# Patient Record
Sex: FEMALE | Race: WHITE | NOT HISPANIC OR LATINO | Employment: OTHER | URBAN - METROPOLITAN AREA
[De-identification: names, ages, dates, MRNs, and addresses within clinical notes are randomized per-mention and may not be internally consistent; named-entity substitution may affect disease eponyms.]

---

## 2020-10-14 ENCOUNTER — TRANSCRIBE ORDERS (OUTPATIENT)
Dept: PHYSICAL THERAPY | Facility: CLINIC | Age: 65
End: 2020-10-14

## 2020-10-14 DIAGNOSIS — G89.29 CHRONIC LOW BACK PAIN, UNSPECIFIED BACK PAIN LATERALITY, UNSPECIFIED WHETHER SCIATICA PRESENT: Primary | ICD-10-CM

## 2020-10-14 DIAGNOSIS — M54.50 CHRONIC LOW BACK PAIN, UNSPECIFIED BACK PAIN LATERALITY, UNSPECIFIED WHETHER SCIATICA PRESENT: Primary | ICD-10-CM

## 2020-10-16 ENCOUNTER — TREATMENT (OUTPATIENT)
Dept: PHYSICAL THERAPY | Facility: CLINIC | Age: 65
End: 2020-10-16

## 2020-10-16 DIAGNOSIS — M79.604 LUMBAR PAIN WITH RADIATION DOWN RIGHT LEG: ICD-10-CM

## 2020-10-16 DIAGNOSIS — M54.50 LUMBAR PAIN WITH RADIATION DOWN RIGHT LEG: ICD-10-CM

## 2020-10-16 DIAGNOSIS — R53.1 WEAKNESS: Primary | ICD-10-CM

## 2020-10-16 DIAGNOSIS — R26.81 UNSTEADY GAIT: ICD-10-CM

## 2020-10-16 PROCEDURE — 97162 PT EVAL MOD COMPLEX 30 MIN: CPT | Performed by: PHYSICAL THERAPIST

## 2020-10-16 PROCEDURE — 97110 THERAPEUTIC EXERCISES: CPT | Performed by: PHYSICAL THERAPIST

## 2020-10-16 NOTE — PROGRESS NOTES
Physical Therapy Initial Evaluation and Plan of Care    Patient: Eunice Ferris   : 1955  Diagnosis/ICD-10 Code:  Lumbar pain with radiation down right leg [M54.5]  Referring practitioner: Elin Erwin, PhD  Date of Initial Visit: 10/16/2020  Today's Date: 10/16/2020  Patient seen for 1 sessions           Subjective Questionnaire: Oswestry: 42/50       Subjective Evaluation    History of Present Illness  Mechanism of injury: Patient reports that she has had ongoing lumbar and RLE pain for years.  She did receive PT 2 yrs ago @ [facility], wo relief. Lumbar fusion  (Dr. Fernandez, );  States the pain level is much worse post the surgery. The original injury was from a fall/crush injury to L4L5 and R ankle fracture ( boot only).  She had a repeat myelogram post the surgery ( 2016)/  She reports over the last several months the pain frequency has increased.  She is using a rollator walker for ambulation x's 2 yrs due to the RLE pain and the RLE sudden onset of numbness.  Fall within the last yr, when the chair rolled out when she went to sit.   Also notes trips within [place]   Current home ex:  no  CHIEF CO:  Pain level/frequency  Difficulty getting around and dressing herself.  Imaging and EMG several yrs ago @ [facility]  TIME OF DAY:  No change relating to time of day.   SLEEP:   Normal sleep is 7 yrs hrs; positioning multi position.      Now she can only sleep about 2 hrs in the bed and then move to the recliner and wakes Q hr.   INCREASES:  Standing > walking,    DECREASES:  Heating pad   PAST MEDICAL HISTORY:     Breast CA with keesha mastectomy .     Quality of life: fair    Pain  Current pain ratin  At best pain ratin  At worst pain rating: 10 (90% of the time)  Quality: sharp, throbbing, radiating and burning (burning/tingling into the distal R thigh and foot)  Relieving factors: heat  Aggravating factors: sleeping, standing, movement, lifting and  ambulation    Social Support  Lives in: one-story house (2-step entry wo rail..   She plans to move by Thanksgiving closer to her dgtr and no step entry. )  Lives with: alone    Hand dominance: right    Treatments  Previous treatment: physical therapy, medication and injection treatment (last epidural was approx 2016 )  Current treatment: medication  Patient Goals  Patient goals for therapy: independence with ADLs/IADLs and increased strength  Patient goal: get more mobile.            Objective          Active Range of Motion   Left Hip   Flexion: 95 degrees   Abduction: WFL    Right Hip   Flexion: 68 degrees   Abduction: WFL  Left Knee   Flexion: WFL  Extension: WFL    Right Knee   Flexion: WFL  Extension: WFL    Additional Active Range of Motion Details  Pnt states that she can not lay prone, thus hip ext was not measured.      Passive Range of Motion     Right Hip   Flexion: 83 degrees     Strength/Myotome Testing     Left Hip   Planes of Motion   Flexion: 3  Abduction: 3  External rotation: 3    Right Hip   Planes of Motion   Flexion: 3-  Abduction: 3  External rotation: 3    Left Knee   Flexion: 3+  Extension: 3+    Right Knee   Flexion: 3+  Extension: 3+    Left Ankle/Foot   Dorsiflexion: 4-  Plantar flexion: 4-  Inversion: 4-  Eversion: 4-    Right Ankle/Foot   Dorsiflexion: 4-  Plantar flexion: 3+  Inversion: 4-  Eversion: 4-    Ambulation     Ambulation: Level Surfaces   Ambulation with assistive device: independent    Observational Gait   Decreased walking speed, stride length and left step length.   Right foot contact pattern: foot flat  Base of support: normal    Quality of Movement During Gait   Trunk  Forward lean.     Pelvis  Posterior pelvic tilt.           Assessment & Plan     Assessment  Impairments: abnormal coordination, abnormal gait, activity intolerance, impaired balance, impaired physical strength, lacks appropriate home exercise program, pain with function, safety issue and weight-bearing  intolerance  Assessment details: Ms. Ferris is a 65 yr old female referred to PT due to ongoing lumbar and RLE pain.  She states that her goal is to get stronger with better mobility.  She was able to stance unsupported; however upon attempts to step with would hold onto objects; she was also unable to SLS unless with use of UE support and then for only a fraction of a second.  Chetna does report lumbar pain; but would prefer to focus on her mobility and strength at this time.     This evaluation is on moderate nature due to:  1) medical history, 2)# areas assessed, 3) evolving and 4) decision making  Barriers to therapy: length of ongoing pain and immoblity.  prior PT wo relief of pain.  Prognosis: good  Functional Limitations: carrying objects, lifting, walking, pushing, uncomfortable because of pain, sitting, standing and stooping  Goals  Plan Goals: ST visits     1)  Patient will complete SLR ( all planes) in gravity resisted positions.     2)  Patient will report a 25% reduction in pain     3)  Patient will complete SLS x's 7 sec with mild assistance from UEs.     4)  Patient will demonstrate appropriate ex technique for the current ex    LTG   DC     1)  MMT =/> 4/5 BLE in order to improve her ability to complete tasks at home, including ADLs     2)  SLS with ocassional UE assist x's 10 seconds with improved ability to complete task that require transition from one area to another     3)  I ability to ascend/descend 2 steps wo rail     4)  Improve the JEANNETTE survey =/> 9 points     5)  IHEP     Plan  Therapy options: will be seen for skilled physical therapy services  Planned modality interventions: high voltage pulsed current (pain management), traction and thermotherapy (hydrocollator packs)  Planned therapy interventions: manual therapy, abdominal trunk stabilization, balance/weight-bearing training, flexibility, gait training, home exercise program, therapeutic activities, stretching, strengthening,  spinal/joint mobilization, soft tissue mobilization, postural training and neuromuscular re-education  Frequency: 2x week  Duration in visits: 20  Treatment plan discussed with: patient  Plan details: Nustep, heel lifts, step ups,        Timed:         Manual Therapy:         mins  96146;     Therapeutic Exercise:    17     mins  47381;     Neuromuscular Kati:        mins  65093;    Therapeutic Activity:          mins  50348;     Gait Training:           mins  70748;     Ultrasound:          mins  63994;    Ionto                                   mins   57708  Self Care                       4     mins   37830  Canalith Repos         mins 17413      Un-Timed:  Electrical Stimulation:         mins  27400 ( );  Dry Needling          mins self-pay  Traction          mins 89690  Low Eval          Mins  11123  Mod Eval     31     Mins  10041  High Eval                            Mins  14911  Re-Eval                               mins  31243  31      Timed Treatment:   20   mins   Total Treatment:     51   mins    PT SIGNATURE: Salima Bullock PT   DATE TREATMENT INITIATED: 10/16/2020    Initial Certification  Certification Period: 1/14/2021  I certify that the therapy services are furnished while this patient is under my care.  The services outlined above are required by this patient, and will be reviewed every 90 days.     PHYSICIAN: Elin Erwin, PhD      DATE:     Please sign and return via fax to [phone].. Thank you, [facility].

## 2020-10-20 ENCOUNTER — TREATMENT (OUTPATIENT)
Dept: PHYSICAL THERAPY | Facility: CLINIC | Age: 65
End: 2020-10-20

## 2020-10-20 DIAGNOSIS — R26.81 UNSTEADY GAIT: ICD-10-CM

## 2020-10-20 DIAGNOSIS — R53.1 WEAKNESS: ICD-10-CM

## 2020-10-20 DIAGNOSIS — M54.50 LUMBAR PAIN WITH RADIATION DOWN RIGHT LEG: Primary | ICD-10-CM

## 2020-10-20 DIAGNOSIS — M79.604 LUMBAR PAIN WITH RADIATION DOWN RIGHT LEG: Primary | ICD-10-CM

## 2020-10-20 PROCEDURE — 97110 THERAPEUTIC EXERCISES: CPT | Performed by: PHYSICAL THERAPIST

## 2020-10-20 NOTE — PROGRESS NOTES
Physical Therapy Daily Progress Note    VISIT#: 2    Subjective   Eunice Ferris reports she feels okay at rest but her RLE  Current Pain Level: 3-4/10 at rest    Objective   Patient tolerates laying on her back for exercises better when she has a wedge underneath her upper body.  See Exercise, Manual, and Modality Logs for complete treatment.     Patient Education: added SAQ. Continued to work on PPT and breathing technique. Handout given for SAQ and supine hip abduction.    Assessment/Plan  Patient does have difficulty breathing while alex the core. Low tolerance to exercise at this time. Able to perform 6 SLR's on the RLE today which was a huge a achievement for the patient. Her nerve pain would flare up with hip fall outs.    Progress per Plan of Care    Goals  Plan Goals: ST visits     1)  Patient will complete SLR ( all planes) in gravity resisted positions.     2)  Patient will report a 25% reduction in pain     3)  Patient will complete SLS x's 7 sec with mild assistance from UEs.     4)  Patient will demonstrate appropriate ex technique for the current ex    LTG   DC     1)  MMT =/> 4/5 BLE in order to improve her ability to complete tasks at home, including ADLs     2)  SLS with ocassional UE assist x's 10 seconds with improved ability to complete task that require transition from one area to another     3)  I ability to ascend/descend 2 steps wo rail     4)  Improve the JEANNETTE survey =/> 9 points     5)  IHEP           Timed:            Therapeutic Exercise:    36     mins  73989;         Un-Timed:      Timed Treatment:   36   mins   Total Treatment:     36   mins    Kierra Menard PTA    Physical Therapist Assistant

## 2020-10-26 ENCOUNTER — TREATMENT (OUTPATIENT)
Dept: PHYSICAL THERAPY | Facility: CLINIC | Age: 65
End: 2020-10-26

## 2020-10-26 DIAGNOSIS — R53.1 WEAKNESS: ICD-10-CM

## 2020-10-26 DIAGNOSIS — R26.81 UNSTEADY GAIT: ICD-10-CM

## 2020-10-26 DIAGNOSIS — M79.604 LUMBAR PAIN WITH RADIATION DOWN RIGHT LEG: Primary | ICD-10-CM

## 2020-10-26 DIAGNOSIS — M54.50 LUMBAR PAIN WITH RADIATION DOWN RIGHT LEG: Primary | ICD-10-CM

## 2020-10-26 PROCEDURE — 97110 THERAPEUTIC EXERCISES: CPT | Performed by: PHYSICAL THERAPIST

## 2020-10-26 NOTE — PROGRESS NOTES
Physical Therapy Daily Progress Note    VISIT#: 3    Subjective   Eunice Ferris reports her flexibility is improving and she is now able to get her shoes on a little easier. The nerve pain remains unchanged.  Current Pain Level: 8/10    Objective     See Exercise, Manual, and Modality Logs for complete treatment.     Patient Education: added H/L hip abd w/ TB. Handout and TB given to patient.    Assessment/Plan  Pt performed increased reps on the RLE but reported increased nerve pain. Able to move easier in general but the pain level remains the same. Good tolerance to the NuStep.    Progress per Plan of Care     Goals  Plan Goals: ST visits     1)  Patient will complete SLR ( all planes) in gravity resisted positions.     2)  Patient will report a 25% reduction in pain     3)  Patient will complete SLS x's 7 sec with mild assistance from UEs.     4)  Patient will demonstrate appropriate ex technique for the current ex    LTG   DC     1)  MMT =/> 4/5 BLE in order to improve her ability to complete tasks at home, including ADLs     2)  SLS with ocassional UE assist x's 10 seconds with improved ability to complete task that require transition from one area to another     3)  I ability to ascend/descend 2 steps wo rail     4)  Improve the JEANNETTE survey =/> 9 points     5)  IHEP           Timed:            Therapeutic Exercise:    40     mins  52282;         Un-Timed:      Timed Treatment:   40   mins   Total Treatment:     45   mins    Kierra Menard PTA    Physical Therapist Assistant

## 2020-10-28 ENCOUNTER — TREATMENT (OUTPATIENT)
Dept: PHYSICAL THERAPY | Facility: CLINIC | Age: 65
End: 2020-10-28

## 2020-10-28 DIAGNOSIS — M54.50 LUMBAR PAIN WITH RADIATION DOWN RIGHT LEG: Primary | ICD-10-CM

## 2020-10-28 DIAGNOSIS — M79.604 LUMBAR PAIN WITH RADIATION DOWN RIGHT LEG: Primary | ICD-10-CM

## 2020-10-28 DIAGNOSIS — R53.1 WEAKNESS: ICD-10-CM

## 2020-10-28 DIAGNOSIS — R26.81 UNSTEADY GAIT: ICD-10-CM

## 2020-10-28 PROCEDURE — 97110 THERAPEUTIC EXERCISES: CPT | Performed by: PHYSICAL THERAPIST

## 2020-10-28 NOTE — PROGRESS NOTES
Physical Therapy Daily Progress Note    VISIT#: 4    Subjective   Eunice Ferris reports she has been pretty sore over the past few days. She always feels a little worse on rainy/cold days.  Current Pain Level: 8/10      Objective     See Exercise, Manual, and Modality Logs for complete treatment.     Patient Education: added wt to SAQ. Added butterfly str and seated HS stretch. Handouts given.    Assessment/Plan  SLR's on the RLE remain very challenging to complete. By the end of her session she reported that this is the best she has felt in a very long time. She did present with the nerve pain down the RLE during treatment however it did not seem to occur as frequently when compared to previous sessions. Her flexibility is also showing bassam improvement.    Progress per Plan of Care     Goals  Plan Goals: ST visits     1)  Patient will complete SLR ( all planes) in gravity resisted positions.     2)  Patient will report a 25% reduction in pain     3)  Patient will complete SLS x's 7 sec with mild assistance from UEs.     4)  Patient will demonstrate appropriate ex technique for the current ex    LTG   DC     1)  MMT =/> 4/5 BLE in order to improve her ability to complete tasks at home, including ADLs     2)  SLS with ocassional UE assist x's 10 seconds with improved ability to complete task that require transition from one area to another     3)  I ability to ascend/descend 2 steps wo rail     4)  Improve the JEANNETTE survey =/> 9 points     5)  IHEP           Timed:            Therapeutic Exercise:    45     mins  [id number];           Un-Timed:        Timed Treatment:   45   mins   Total Treatment:     45   mins    Kierra Menard PTA    Physical Therapist Assistant

## 2020-11-02 ENCOUNTER — TREATMENT (OUTPATIENT)
Dept: PHYSICAL THERAPY | Facility: CLINIC | Age: 65
End: 2020-11-02

## 2020-11-02 DIAGNOSIS — R53.1 WEAKNESS: ICD-10-CM

## 2020-11-02 DIAGNOSIS — R26.81 UNSTEADY GAIT: ICD-10-CM

## 2020-11-02 DIAGNOSIS — M54.50 LUMBAR PAIN WITH RADIATION DOWN RIGHT LEG: Primary | ICD-10-CM

## 2020-11-02 DIAGNOSIS — M79.604 LUMBAR PAIN WITH RADIATION DOWN RIGHT LEG: Primary | ICD-10-CM

## 2020-11-02 PROCEDURE — 97110 THERAPEUTIC EXERCISES: CPT | Performed by: PHYSICAL THERAPIST

## 2020-11-02 NOTE — PROGRESS NOTES
Physical Therapy Daily Progress Note    VISIT#: 5    Subjective   Eunice Ferris reports her nerve pain is flared up but as far as her muscle soreness she feels really good.   Current Pain Level: 8-9/10 (nerve)    Objective     See Exercise, Manual, and Modality Logs for complete treatment.     Patient Education: added neural slump    Assessment/Plan  Patient's nerve pain in the RLE was very flared up and made it difficult to perform her exercises on that side. Her motion is showing slight improvement but can be limited by pain at times.      Progress per Plan of Care     Goals  Plan Goals: ST visits     1)  Patient will complete SLR ( all planes) in gravity resisted positions.     2)  Patient will report a 25% reduction in pain     3)  Patient will complete SLS x's 7 sec with mild assistance from UEs.     4)  Patient will demonstrate appropriate ex technique for the current ex    LTG   DC     1)  MMT =/> 4/5 BLE in order to improve her ability to complete tasks at home, including ADLs     2)  SLS with ocassional UE assist x's 10 seconds with improved ability to complete task that require transition from one area to another     3)  I ability to ascend/descend 2 steps wo rail     4)  Improve the JEANNETTE survey =/> 9 points     5)  IHEP           Timed:            Therapeutic Exercise:    46     mins  40112;            Un-Timed:      Timed Treatment:   46   mins   Total Treatment:     46   mins    Kierra Menard PTA    Physical Therapist Assistant

## 2020-11-04 ENCOUNTER — TREATMENT (OUTPATIENT)
Dept: PHYSICAL THERAPY | Facility: CLINIC | Age: 65
End: 2020-11-04

## 2020-11-04 DIAGNOSIS — M54.50 LUMBAR PAIN WITH RADIATION DOWN RIGHT LEG: Primary | ICD-10-CM

## 2020-11-04 DIAGNOSIS — M79.604 LUMBAR PAIN WITH RADIATION DOWN RIGHT LEG: Primary | ICD-10-CM

## 2020-11-04 DIAGNOSIS — R26.81 UNSTEADY GAIT: ICD-10-CM

## 2020-11-04 DIAGNOSIS — R53.1 WEAKNESS: ICD-10-CM

## 2020-11-04 PROCEDURE — 97110 THERAPEUTIC EXERCISES: CPT | Performed by: PHYSICAL THERAPIST

## 2020-11-04 NOTE — PROGRESS NOTES
Physical Therapy Daily Progress Note    VISIT#: 6    Subjective   Eunice Ferris reports the stretches are helping her muscles a lot but her nerve pain remains unchanged.  Current Pain Level: 7/10 (nerve)    Objective     See Exercise, Manual, and Modality Logs for complete treatment.     Gait: antalgic with lateral lean to the R, keeps R knee bent; encouraged her to keep her knee straight during the stance phase on the RLE.    Assessment/Plan  Patient became tearful during her session due to frustration with her condition. She can tell PT is helping her muscles but bc of the nerve pain and not being approved for an MRI/nerve ablation she feels like she just isn't getting better. She does tolerate some of the stretches without increased pain but the strengthening is still very challenging and painful.    Next Visit: standing heel raises, weight shifting    Progress per Plan of Care     Goals  Plan Goals: ST visits     1)  Patient will complete SLR ( all planes) in gravity resisted positions.     2)  Patient will report a 25% reduction in pain     3)  Patient will complete SLS x's 7 sec with mild assistance from UEs.     4)  Patient will demonstrate appropriate ex technique for the current ex    LTG   DC     1)  MMT =/> 4/5 BLE in order to improve her ability to complete tasks at home, including ADLs     2)  SLS with ocassional UE assist x's 10 seconds with improved ability to complete task that require transition from one area to another     3)  I ability to ascend/descend 2 steps wo rail     4)  Improve the JEANNETTE survey =/> 9 points     5)  IHEP           Timed:            Therapeutic Exercise:    40     mins  73911;          Un-Timed:      Timed Treatment:   40   mins   Total Treatment:     40   mins    Kierra Menard PTA    Physical Therapist Assistant

## 2020-11-09 ENCOUNTER — TREATMENT (OUTPATIENT)
Dept: PHYSICAL THERAPY | Facility: CLINIC | Age: 65
End: 2020-11-09

## 2020-11-09 DIAGNOSIS — M79.604 LUMBAR PAIN WITH RADIATION DOWN RIGHT LEG: Primary | ICD-10-CM

## 2020-11-09 DIAGNOSIS — R26.81 UNSTEADY GAIT: ICD-10-CM

## 2020-11-09 DIAGNOSIS — M54.50 LUMBAR PAIN WITH RADIATION DOWN RIGHT LEG: Primary | ICD-10-CM

## 2020-11-09 DIAGNOSIS — R53.1 WEAKNESS: ICD-10-CM

## 2020-11-09 PROCEDURE — 97530 THERAPEUTIC ACTIVITIES: CPT | Performed by: PHYSICAL THERAPIST

## 2020-11-09 PROCEDURE — 97110 THERAPEUTIC EXERCISES: CPT | Performed by: PHYSICAL THERAPIST

## 2020-11-09 NOTE — PROGRESS NOTES
Physical Therapy Daily Progress Note    VISIT#: 7    Subjective   Eunice Ferris reports she has started using her quad to cane to help her stand more upright. This has helped her pain some. She has to be very cautious when using the cane so she doesn't loose her balance.  Current Pain Level: 8/10 (nerve)    Objective     See Exercise, Manual, and Modality Logs for complete treatment.     Patient Education: added standing heel raises and diagonal weight shifts on foam pad. Handouts given for HEP.    Assessment/Plan  Patient seemed to transition between different positions with a little more ease. Nerve pain in the RLE is still present and at high levels but she is able to push/work through it. Tolerated new exercises well.       Progress per Plan of Care     Goals  Plan Goals: ST visits     1)  Patient will complete SLR ( all planes) in gravity resisted positions.     2)  Patient will report a 25% reduction in pain     3)  Patient will complete SLS x's 7 sec with mild assistance from UEs.     4)  Patient will demonstrate appropriate ex technique for the current ex    LTG   DC     1)  MMT =/> 4/5 BLE in order to improve her ability to complete tasks at home, including ADLs     2)  SLS with ocassional UE assist x's 10 seconds with improved ability to complete task that require transition from one area to another     3)  I ability to ascend/descend 2 steps wo rail     4)  Improve the JEANNETTE survey =/> 9 points     5)  IHEP           Timed:           Therapeutic Exercise:    35     mins  54677;      Therapeutic Activity:     10     mins  55920;         Un-Timed:      Timed Treatment:   45   mins   Total Treatment:     48   mins    Kierra Menard PTA    Physical Therapist Assistant

## 2020-11-11 ENCOUNTER — TREATMENT (OUTPATIENT)
Dept: PHYSICAL THERAPY | Facility: CLINIC | Age: 65
End: 2020-11-11

## 2020-11-11 DIAGNOSIS — R26.81 UNSTEADY GAIT: ICD-10-CM

## 2020-11-11 DIAGNOSIS — R53.1 WEAKNESS: ICD-10-CM

## 2020-11-11 DIAGNOSIS — M79.604 LUMBAR PAIN WITH RADIATION DOWN RIGHT LEG: Primary | ICD-10-CM

## 2020-11-11 DIAGNOSIS — M54.50 LUMBAR PAIN WITH RADIATION DOWN RIGHT LEG: Primary | ICD-10-CM

## 2020-11-11 PROCEDURE — 97140 MANUAL THERAPY 1/> REGIONS: CPT | Performed by: PHYSICAL THERAPIST

## 2020-11-11 PROCEDURE — 97110 THERAPEUTIC EXERCISES: CPT | Performed by: PHYSICAL THERAPIST

## 2020-11-11 NOTE — PROGRESS NOTES
Physical Therapy Daily Progress Note    VISIT#: 8    Subjective   Eunice Ferris reports pain remained unchanged in the nerve. However, she has been walking better and standing taller as she walks. She was a little sore after shower this morning. The muscle ache does subside after a while.   Pain Level: 6/10 (nerve)    Objective     See Exercise, Manual, and Modality Logs for complete treatment.     Patient Education Instructed the patient in bridges for HEP.    Assessment/Plan   The pt pushed through the exercises and tolerated them well. Introduced a new exercise (bridges) and the patient was able to complete it without any pain. However, she was fatigued towards the end of the therapy session.    Progress per plan of care.    Next Visit: progress standing exercises as tolerated    Goals  Plan Goals: ST visits     1)  Patient will complete SLR ( all planes) in gravity resisted positions.     2)  Patient will report a 25% reduction in pain     3)  Patient will complete SLS x's 7 sec with mild assistance from UEs.     4)  Patient will demonstrate appropriate ex technique for the current ex    LTG   DC     1)  MMT =/> 4/5 BLE in order to improve her ability to complete tasks at home, including ADLs     2)  SLS with ocassional UE assist x's 10 seconds with improved ability to complete task that require transition from one area to another     3)  I ability to ascend/descend 2 steps wo rail     4)  Improve the JEANNETTE survey =/> 9 points     5)  IHEP              Timed:         Manual Therapy:    10     mins  16605;     Therapeutic Exercise:    35   mins  03328;       Un-Timed:      Timed Treatment:   45   mins   Total Treatment:     50  mins    Kierra Menard PTA    Physical Therapist Assistant

## 2020-11-16 ENCOUNTER — TREATMENT (OUTPATIENT)
Dept: PHYSICAL THERAPY | Facility: CLINIC | Age: 65
End: 2020-11-16

## 2020-11-16 DIAGNOSIS — M79.604 LUMBAR PAIN WITH RADIATION DOWN RIGHT LEG: Primary | ICD-10-CM

## 2020-11-16 DIAGNOSIS — M54.50 LUMBAR PAIN WITH RADIATION DOWN RIGHT LEG: Primary | ICD-10-CM

## 2020-11-16 PROCEDURE — 97140 MANUAL THERAPY 1/> REGIONS: CPT | Performed by: PHYSICAL THERAPIST

## 2020-11-16 PROCEDURE — 97110 THERAPEUTIC EXERCISES: CPT | Performed by: PHYSICAL THERAPIST

## 2020-11-16 NOTE — PROGRESS NOTES
Physical Therapy Daily Progress Note    VISIT#: 9    Subjective   Eunice Ferris reports she is moving into a new house and has been having to move around a lot more. Therefore, her right leg has been very tight.   Current Pain Level: 8/10 (nerve)      Objective     See Exercise, Manual, and Modality Logs for complete treatment.     Patient Education: Instructed the patient in a new stretch (prone quad stretch). The patient tolerated this stretch really well on the left leg, but it was difficult on the right leg. The patient was also given a handout of this stretch for HEP.     Assessment/Plan   The patient c/o tightness in the right leg so we mainly worked on stretches today instead of strengthening exercises. The patient completed all of the stretches/exercises today with mild fatigue due to the tightness in the right leg. The nerve pain in her right leg made it difficult for her to complete the prone quad stretch but she was able to complete the other side without any issue.    Progress per plan of care       Goals  Plan Goals: ST visits     1)  Patient will complete SLR ( all planes) in gravity resisted positions.     2)  Patient will report a 25% reduction in pain     3)  Patient will complete SLS x's 7 sec with mild assistance from UEs.     4)  Patient will demonstrate appropriate ex technique for the current ex    LTG   DC     1)  MMT =/> 4/5 BLE in order to improve her ability to complete tasks at home, including ADLs     2)  SLS with ocassional UE assist x's 10 seconds with improved ability to complete task that require transition from one area to another     3)  I ability to ascend/descend 2 steps wo rail     4)  Improve the JEANNETTE survey =/> 9 points     5)  IHEP                 Timed:         Manual Therapy:    10    mins  78937;     Therapeutic Exercise:    28  mins  34800;         Timed Treatment:   38   mins   Total Treatment:     48   mins    Kierra Menard PTA    Physical Therapist Assistant

## 2020-11-25 ENCOUNTER — TREATMENT (OUTPATIENT)
Dept: PHYSICAL THERAPY | Facility: CLINIC | Age: 65
End: 2020-11-25

## 2020-11-25 DIAGNOSIS — M54.50 LUMBAR PAIN WITH RADIATION DOWN RIGHT LEG: Primary | ICD-10-CM

## 2020-11-25 DIAGNOSIS — M79.604 LUMBAR PAIN WITH RADIATION DOWN RIGHT LEG: Primary | ICD-10-CM

## 2020-11-25 DIAGNOSIS — R53.1 WEAKNESS: ICD-10-CM

## 2020-11-25 DIAGNOSIS — R26.81 UNSTEADY GAIT: ICD-10-CM

## 2020-11-25 PROCEDURE — 97110 THERAPEUTIC EXERCISES: CPT | Performed by: PHYSICAL THERAPIST

## 2020-11-25 PROCEDURE — 97530 THERAPEUTIC ACTIVITIES: CPT | Performed by: PHYSICAL THERAPIST

## 2020-11-25 PROCEDURE — 97140 MANUAL THERAPY 1/> REGIONS: CPT | Performed by: PHYSICAL THERAPIST

## 2020-11-25 NOTE — PROGRESS NOTES
Physical Therapy Daily Progress Note    VISIT#: 10    Subjective   Eunice Ferris reports her Dr said since will be completing 10 visits they will approve her MRI. She sees the MD on Dec 2 and they will schedule her MRI at that point.  Current Pain Level: 8/10 nerve pain     0-1/10 muscle pain    Objective   TTP along B IT bands from the hip to the knee.    See Exercise, Manual, and Modality Logs for complete treatment.     Patient Education: cues for the ex    Assessment/Plan  Patient has shown progress with PT with her hip ROM, muscle pain and ability to transition into different positions. Her gait has also shown improvement since the eval by demonstrating a decreased limp however doesn't feel safe to ambulate without an AD due to her nerve pain. Her nerve pain into the RLE remains high and makes her leg feel like it wants to give out. She returns to the MD next week and will be scheduling an MRI. Pending the results of the MRI she is hoping to get an ablation approved. She feels as if she is starting to hit a plateau with PT due to her nerve pain.      Progress per plan of care         Goals  Plan Goals: ST visits     1)  Patient will complete SLR ( all planes) in gravity resisted positions.     2)  Patient will report a 25% reduction in pain     3)  Patient will complete SLS x's 7 sec with mild assistance from UEs.     4)  Patient will demonstrate appropriate ex technique for the current ex    LTG   DC     1)  MMT =/> 4/5 BLE in order to improve her ability to complete tasks at home, including ADLs     2)  SLS with ocassional UE assist x's 10 seconds with improved ability to complete task that require transition from one area to another     3)  I ability to ascend/descend 2 steps wo rail     4)  Improve the JEANNETTE survey =/> 9 points     5)  IHEP           Timed:         Manual Therapy:    10     mins  93998;     Therapeutic Exercise:    22     mins  88121;     Therapeutic Activity:     10     mins  65898;          Un-Timed:      Timed Treatment:   42   mins   Total Treatment:     50   mins    Kierra Menard, PTA    Physical Therapist Assistant

## 2020-12-01 ENCOUNTER — TREATMENT (OUTPATIENT)
Dept: PHYSICAL THERAPY | Facility: CLINIC | Age: 65
End: 2020-12-01

## 2020-12-01 DIAGNOSIS — R53.1 WEAKNESS: ICD-10-CM

## 2020-12-01 DIAGNOSIS — M54.50 LUMBAR PAIN WITH RADIATION DOWN RIGHT LEG: Primary | ICD-10-CM

## 2020-12-01 DIAGNOSIS — R26.81 UNSTEADY GAIT: ICD-10-CM

## 2020-12-01 DIAGNOSIS — M79.604 LUMBAR PAIN WITH RADIATION DOWN RIGHT LEG: Primary | ICD-10-CM

## 2020-12-01 PROCEDURE — 97110 THERAPEUTIC EXERCISES: CPT | Performed by: PHYSICAL THERAPIST

## 2020-12-01 PROCEDURE — 97140 MANUAL THERAPY 1/> REGIONS: CPT | Performed by: PHYSICAL THERAPIST

## 2020-12-01 PROCEDURE — 97164 PT RE-EVAL EST PLAN CARE: CPT | Performed by: PHYSICAL THERAPIST

## 2020-12-01 NOTE — PROGRESS NOTES
Re-Assessment / Progress Note    Patient: Eunice Ferris   : 1955  Diagnosis/ICD-10 Code:  Lumbar pain with radiation down right leg [M54.5]  Referring practitioner: Elin Erwin, PhD  Date of Initial Visit: Episode Type: THERAPY  Noted: 10/16/2020    Today's Date: 2020  Patient seen for 11 sessions.      Subjective:   Eunice Ferris reports: that she feels that her muscle pain has decreased but her nerve pain is the same if not worse at this time. Pt rates her nerve pain a 7/10 and her muscel pain a 4/10 today.   Subjective Questionnaire: Oswestry: 80% disability  Clinical Progress: Pt reports that her muscle pain and improved but her nerve pain is unchanged or worsened  Home Program Compliance: Yes  Treatment has included: therapeutic exercise, neuromuscular re-education, manual therapy, therapeutic activity, moist heat and cryotherapy    Subjective   Objective           General Comments     Lumbar Comments  Active Range of Motion   Left Hip   Flexion: 100 degrees   Abduction: WFL  Right Hip   Flexion: 80 degrees   Abduction: WFL  Left Knee   Flexion: WFL  Extension: WFL    Right Knee   Flexion: WFL  Extension: WFL    Additional Active Range of Motion Details  Pnt states that she can not lay prone, thus hip ext was not measured.      Strength/Myotome Testing     Left Hip   Planes of Motion   Flexion: 5  Abduction: 5  External rotation: 5    Right Hip   Planes of Motion   Flexion: 4+  Abduction:5  External rotation: 5    Left Knee   Flexion: 5  Extension: 5    Right Knee   Flexion: 5  Extension: 5    Left Ankle/Foot   Dorsiflexion: 5  Plantar flexion: 5  Inversion: 5  Eversion: 5    Right Ankle/Foot   Dorsiflexion: 5  Plantar flexion: 5  Inversion: 5  Eversion: 5    Ambulation     Ambulation: Level Surfaces   Ambulation with assistive device: independent    Observational Gait   Decreased walking speed, stride length and left step length.   Right foot contact pattern: foot flat  Base of support:  normal    Quality of Movement During Gait   Trunk  Forward lean.     Pelvis  Posterior pelvic tilt.                      Assessment & Plan     Assessment  Impairments: abnormal coordination, abnormal gait, abnormal or restricted ROM, activity intolerance, impaired balance, impaired physical strength, safety issue and weight-bearing intolerance  Assessment details: Ms. Ferris is a 65 yr old female referred to PT due to ongoing lumbar and RLE pain.  At initial eval she statee that her goal is to get stronger with better mobility.  She was able to stance unsupported; however upon attempts to step with would hold onto objects; she was also unable to SLS unless with use of UE support and then for only a fraction of a second.  Chetna does report lumbar pain; but would prefer to focus on her mobility and strength at this time. Today pt reports that she has noted improvements in her muscle pain but is continuing to have nerve pain Pt has demonstrated improvements in JOVI hip ROM and BLE strength, but still demonstrates 80 % disability on Modified  Oswestry, and has overall high levels of pain. Pt would benefit from skilled PT to address the remaining impairments.               Goals  Plan Goals: Plan Goals: ST visits     1)  Patient will complete SLR ( all planes) in gravity resisted positions.- progressin     2)  Patient will report a 25% reduction in pain- progressing 20% muscle pain     3)  Patient will complete SLS x's 7 sec with mild assistance from UEs.- progressing     4)  Patient will demonstrate appropriate ex technique for the current ex- met    LTG   DC     1)  MMT =/> 4/5 BLE in order to improve her ability to complete tasks at home, including ADLs- progressing     2)  SLS with ocassional UE assist x's 10 seconds with improved ability to complete task that require transition from one area to another- progressing     3)  I ability to ascend/descend 2 steps wo rail- progressing     4)  Improve the JEANNETTE survey  =/> 9 points- progresing     5)  IHEP - progressing    Plan  Therapy options: will be seen for skilled physical therapy services  Duration in visits: 12  Treatment plan discussed with: patient      Progress toward previous goals: Partially Met      Recommendations: Continue as planned  Timeframe: 6 weeks  Prognosis to achieve goals: fair    PT Signature: Layla Singh, PT  [place] Lic. # [id number]        Based upon review of the patient's progress and continued therapy plan, it is my medical opinion that Eunice Ferris should continue physical therapy treatment at [facility]  [address]  [place] [address] [phone].    Signature: __________________________________  Elin Erwin, PhD    Timed:         Manual Therapy:    10     mins  18234;     Therapeutic Exercise:    20     mins  92484;     Neuromuscular Kati:        mins  42549;    Therapeutic Activity:          mins  48844;     Gait Training:           mins  40694;     Ultrasound:          mins  50741;    Ionto                                   mins   96744  Self Care                            mins   49840        Un-Timed:  Electrical Stimulation:         mins  52728 ( );  Dry Needling          mins self-pay  Traction          mins 54156  Low Eval          Mins  43255  Mod Eval          Mins  64608  High Eval                            Mins  18581  Re-Eval                           15    mins  95709        Timed Treatment:   30   mins   Total Treatment:     45   mins

## 2020-12-03 ENCOUNTER — TREATMENT (OUTPATIENT)
Dept: PHYSICAL THERAPY | Facility: CLINIC | Age: 65
End: 2020-12-03

## 2020-12-03 DIAGNOSIS — M54.50 LUMBAR PAIN WITH RADIATION DOWN RIGHT LEG: Primary | ICD-10-CM

## 2020-12-03 DIAGNOSIS — M79.604 LUMBAR PAIN WITH RADIATION DOWN RIGHT LEG: Primary | ICD-10-CM

## 2020-12-03 PROCEDURE — 97140 MANUAL THERAPY 1/> REGIONS: CPT | Performed by: PHYSICAL THERAPIST

## 2020-12-03 PROCEDURE — 97530 THERAPEUTIC ACTIVITIES: CPT | Performed by: PHYSICAL THERAPIST

## 2020-12-03 PROCEDURE — 97110 THERAPEUTIC EXERCISES: CPT | Performed by: PHYSICAL THERAPIST

## 2020-12-03 NOTE — PROGRESS NOTES
Physical Therapy Daily Progress Note    VISIT#: 12  Subjective   Eunice Ferris reports: she has been having trouble lying on her right side due to the pinched nerve. The nerve pain has remained the same. She is waiting for MRI to be scheduled.  Current Pain Level: 8/10 (nerve pain)      Objective     See Exercise, Manual, and Modality Logs for complete treatment.     Patient Education: Incorporated weights on the SLR.     Assessment/Plan   Incorporated 1# weights on the SLR. The patient tolerated it really well on the left leg but only able to do half the repetitions on the right leg due to pain. The patient was able to complete all of the other exercises and took breaks in between as needed.     Progress per plan of care    Goals  Plan Goals: Plan Goals: ST visits     1)  Patient will complete SLR ( all planes) in gravity resisted positions.- progressin     2)  Patient will report a 25% reduction in pain- progressing 20% muscle pain     3)  Patient will complete SLS x's 7 sec with mild assistance from UEs.- progressing     4)  Patient will demonstrate appropriate ex technique for the current ex- met    LTG   DC     1)  MMT =/> 4/5 BLE in order to improve her ability to complete tasks at home, including ADLs- progressing     2)  SLS with ocassional UE assist x's 10 seconds with improved ability to complete task that require transition from one area to another- progressing     3)  I ability to ascend/descend 2 steps wo rail- progressing     4)  Improve the JEANNETTE survey =/> 9 points- progresing     5)  IHEP - progressing                  Timed:         Manual Therapy:    10    mins  98609;     Therapeutic Exercise:    22     mins  82241;    Therapeutic Activity:      10     mins  79454;        Timed Treatment:   42  mins   Total Treatment:    50   mins    Kierra Menard PTA    Physical Therapist Assistant

## 2020-12-10 ENCOUNTER — TREATMENT (OUTPATIENT)
Dept: PHYSICAL THERAPY | Facility: CLINIC | Age: 65
End: 2020-12-10

## 2020-12-10 DIAGNOSIS — M54.50 LUMBAR PAIN WITH RADIATION DOWN RIGHT LEG: Primary | ICD-10-CM

## 2020-12-10 DIAGNOSIS — M79.604 LUMBAR PAIN WITH RADIATION DOWN RIGHT LEG: Primary | ICD-10-CM

## 2020-12-10 DIAGNOSIS — R53.1 WEAKNESS: ICD-10-CM

## 2020-12-10 DIAGNOSIS — R26.81 UNSTEADY GAIT: ICD-10-CM

## 2020-12-10 PROCEDURE — 97110 THERAPEUTIC EXERCISES: CPT | Performed by: PHYSICAL THERAPIST

## 2020-12-10 PROCEDURE — 97530 THERAPEUTIC ACTIVITIES: CPT | Performed by: PHYSICAL THERAPIST

## 2020-12-10 NOTE — PROGRESS NOTES
Physical Therapy Daily Progress Note    VISIT#: 13    Subjective   Eunice Ferris reports she tried to sleep in her bed last night, which she has only done a handful of times in the last 4 years and it has flared her pain up significantly.  Current Pain Level: Muscles 4/10   Nerve 8/10    Objective   Observation: Patient walked into the clinic with her forearms resting on her rollator handles and bent at the waste to alleviate her back pain.    See Exercise, Manual, and Modality Logs for complete treatment.     Assessment/Plan  Had to limit the amount of reps with certain exercises today due to increased pain on her right side. Attempted to perform standing exercises. She painfully completed weight shifts then had to stop. She still hasn't heard back from her insurance if the MRI has been approved.     Goals  Plan Goals: Plan Goals: ST visits     1)  Patient will complete SLR ( all planes) in gravity resisted positions.- progressing     2)  Patient will report a 25% reduction in pain- progressing 20% muscle pain- not met     3)  Patient will complete SLS x's 7 sec with mild assistance from UEs.- progressing     4)  Patient will demonstrate appropriate ex technique for the current ex- met    LTG   DC     1)  MMT =/> 4/5 BLE in order to improve her ability to complete tasks at home, including ADLs- progressing     2)  SLS with ocassional UE assist x's 10 seconds with improved ability to complete task that require transition from one area to another- progressing     3)  I ability to ascend/descend 2 steps wo rail- progressing     4)  Improve the JEANNETTE survey =/> 9 points- progressing     5)  IHEP - progressing            Timed:            Therapeutic Exercise:    30     mins  30069;       Therapeutic Activity:     8     mins  76797;           Un-Timed:  Bike 10 min (no charge)      Timed Treatment:   38   mins   Total Treatment:     48   mins    Kierra Menard PTA    Physical Therapist Assistant

## 2020-12-15 ENCOUNTER — TREATMENT (OUTPATIENT)
Dept: PHYSICAL THERAPY | Facility: CLINIC | Age: 65
End: 2020-12-15

## 2020-12-15 DIAGNOSIS — M79.604 LUMBAR PAIN WITH RADIATION DOWN RIGHT LEG: Primary | ICD-10-CM

## 2020-12-15 DIAGNOSIS — R26.81 UNSTEADY GAIT: ICD-10-CM

## 2020-12-15 DIAGNOSIS — R53.1 WEAKNESS: ICD-10-CM

## 2020-12-15 DIAGNOSIS — M54.50 LUMBAR PAIN WITH RADIATION DOWN RIGHT LEG: Primary | ICD-10-CM

## 2020-12-15 PROCEDURE — 97530 THERAPEUTIC ACTIVITIES: CPT | Performed by: PHYSICAL THERAPIST

## 2020-12-15 PROCEDURE — 97110 THERAPEUTIC EXERCISES: CPT | Performed by: PHYSICAL THERAPIST

## 2020-12-15 NOTE — PROGRESS NOTES
"Physical Therapy Daily Progress Note    VISIT#: 14    Subjective   Eunice Ferris reports she is finally getting her MRI done today at 3:15!  Current Pain Level: 5/10 (nerve)    4/10 (muscle)    Objective   Gait: antalgic, longer stride w/ RLE, trunk held upright  See Exercise, Manual, and Modality Logs for complete treatment.     Patient Education: added mini squats to HEP. Handout given.    Assessment/Plan  No reports of the nerve in the R leg \"firing up\" today with any of her exercises which is the first time this has happened since starting PT. Able to tolerate all of her strengthening exercises very well with the addition of mini squats. Improvement seen with her gait (w/o AD). It is still antalgic however her trunk is more upright and she is taking a longer stride with the right leg.      Progress per Plan of Care    Goals  Plan Goals: Plan Goals: ST visits     1)  Patient will complete SLR ( all planes) in gravity resisted positions.- progressing     2)  Patient will report a 25% reduction in pain- progressing 20% muscle pain- not met     3)  Patient will complete SLS x's 7 sec with mild assistance from UEs.- progressing     4)  Patient will demonstrate appropriate ex technique for the current ex- met    LTG   DC     1)  MMT =/> 4/5 BLE in order to improve her ability to complete tasks at home, including ADLs- progressing     2)  SLS with ocassional UE assist x's 10 seconds with improved ability to complete task that require transition from one area to another- progressing     3)  I ability to ascend/descend 2 steps wo rail- progressing     4)  Improve the JEANNETTE survey =/> 9 points- progressing     5)  IHEP - progressing          Timed:            Therapeutic Exercise:    26     mins  88768;       Therapeutic Activity:     16     mins  30697;         Un-Timed:      Timed Treatment:   42   mins   Total Treatment:     42   mins    Kierra Menard PTA    Physical Therapist Assistant  "

## 2020-12-17 ENCOUNTER — TREATMENT (OUTPATIENT)
Dept: PHYSICAL THERAPY | Facility: CLINIC | Age: 65
End: 2020-12-17

## 2020-12-17 PROCEDURE — 97110 THERAPEUTIC EXERCISES: CPT | Performed by: PHYSICAL THERAPIST

## 2020-12-17 PROCEDURE — 97530 THERAPEUTIC ACTIVITIES: CPT | Performed by: PHYSICAL THERAPIST

## 2020-12-17 NOTE — PROGRESS NOTES
Physical Therapy Daily Progress Note    VISIT#: 15    Subjective   Eunice Ferris reports she did get her MRI yesterday. They had to take her on and off the machine 3 times because her LLE kept cramping. She is extremely sore from the MRI.  She has an MD appointment tomorrow to get the results of her MRI.    Current Pain Level: 8/10 (nerve & muscle)    Objective     See Exercise, Manual, and Modality Logs for complete treatment.     Patient Education: nothing new added this time due to increased pain    Assessment/Plan  Patient presented with increased pain/soreness in the LB and BLE (R>L). She was able to complete all exercises asked of her but did them painfully.    Progress per Plan of Care     Goals  Plan Goals: Plan Goals: ST visits     1)  Patient will complete SLR ( all planes) in gravity resisted positions.- progressing     2)  Patient will report a 25% reduction in pain- progressing 20% muscle pain- not met     3)  Patient will complete SLS x's 7 sec with mild assistance from UEs.- progressing     4)  Patient will demonstrate appropriate ex technique for the current ex- met    LTG   DC     1)  MMT =/> 4/5 BLE in order to improve her ability to complete tasks at home, including ADLs- progressing     2)  SLS with ocassional UE assist x's 10 seconds with improved ability to complete task that require transition from one area to another- progressing     3)  I ability to ascend/descend 2 steps wo rail- progressing     4)  Improve the JEANNETTE survey =/> 9 points- progressing     5)  IHEP - progressing          Timed:            Therapeutic Exercise:    15     mins  52398;      Therapeutic Activity:     10     mins  49212;         Un-Timed:  Bike: 10 min (no charge)    Timed Treatment:   35   mins   Total Treatment:     45   mins    Kierra Menard PTA    Physical Therapist Assistant

## 2020-12-22 ENCOUNTER — TREATMENT (OUTPATIENT)
Dept: PHYSICAL THERAPY | Facility: CLINIC | Age: 65
End: 2020-12-22

## 2020-12-22 DIAGNOSIS — M54.50 LUMBAR PAIN WITH RADIATION DOWN RIGHT LEG: Primary | ICD-10-CM

## 2020-12-22 DIAGNOSIS — R26.81 UNSTEADY GAIT: ICD-10-CM

## 2020-12-22 DIAGNOSIS — R53.1 WEAKNESS: ICD-10-CM

## 2020-12-22 DIAGNOSIS — M79.604 LUMBAR PAIN WITH RADIATION DOWN RIGHT LEG: Primary | ICD-10-CM

## 2020-12-22 PROCEDURE — 97110 THERAPEUTIC EXERCISES: CPT | Performed by: PHYSICAL THERAPIST

## 2020-12-22 PROCEDURE — 97530 THERAPEUTIC ACTIVITIES: CPT | Performed by: PHYSICAL THERAPIST

## 2020-12-22 NOTE — PROGRESS NOTES
Physical Therapy Daily Progress Note    VISIT#: 16    Subjective   Eunice Ferris reports the Dr read her MRI. There is a small pocket of fluid underneath her fusion. At L2 there is narrowing of the spine due to arthritis. He wants to do a series of 3 shots at the L2 level to see if he can control her pain with that. She is hoping to get those started next week or first week of January.  Current Pain Level: 8/10 nerve       Objective     See Exercise, Manual, and Modality Logs for complete treatment.     Assessment/Plan  Held mini squats this visit. As she would go down into a squat her L hip would drop lower than her R and cause discomfort in the lower back. The nerve in her RLE flared up multiple times during her exercises this date which required her to rest. Increased muscle cramping occurring in BLE's during treatment.    Progress per Plan of Care     Goals  Plan Goals: Plan Goals: ST visits     1)  Patient will complete SLR ( all planes) in gravity resisted positions.- progressing     2)  Patient will report a 25% reduction in pain- progressing 20% muscle pain- not met     3)  Patient will complete SLS x's 7 sec with mild assistance from UEs.- progressing     4)  Patient will demonstrate appropriate ex technique for the current ex- met    LTG   DC     1)  MMT =/> 4/5 BLE in order to improve her ability to complete tasks at home, including ADLs- progressing     2)  SLS with ocassional UE assist x's 10 seconds with improved ability to complete task that require transition from one area to another- progressing     3)  I ability to ascend/descend 2 steps wo rail- progressing     4)  Improve the JEANNETTE survey =/> 9 points- progressing     5)  IHEP - progressing            Timed:            Therapeutic Exercise:    35     mins  84474;      Therapeutic Activity:     10     mins  26464;       Un-Timed:  Bike 10 min (no charge)      Timed Treatment:   45   mins   Total Treatment:     55   mins    Kierra Menard  PTA    Physical Therapist Assistant

## 2021-02-02 ENCOUNTER — DOCUMENTATION (OUTPATIENT)
Dept: PHYSICAL THERAPY | Facility: CLINIC | Age: 66
End: 2021-02-02

## 2021-02-02 NOTE — PROGRESS NOTES
Discharge Summary  Discharge Summary from Physical Therapy Report      Dates  PT visit: 10/16/2020 - 2020  Number of Visits: 16     Discharge Status of Patient:    The last Pt session was 2020.  At that time patient stated she was to start epidurals within the next few weeks.   She canceled the next PT appt on 2020 and did not return to therapy.    ST visits     1)  Patient will complete SLR ( all planes) in gravity resisted positions.- progressing     2)  Patient will report a 25% reduction in pain- progressing 20% muscle pain- not met     3)  Patient will complete SLS x's 7 sec with mild assistance from UEs.- progressing     4)  Patient will demonstrate appropriate ex technique for the current ex- met    LTG   DC     1)  MMT =/> 4/5 BLE in order to improve her ability to complete tasks at home, including ADLs- progressing     2)  SLS with ocassional UE assist x's 10 seconds with improved ability to complete task that require transition from one area to another- progressing     3)  I ability to ascend/descend 2 steps wo rail- progressing     4)  Improve the JEANNETTE survey =/> 9 points- progressing     5)  IHEP - progressing  Goals: Partially Met    Discharge Plan: Continue with current home exercise program as instructed    Comments   Treatment interventions included therEx, therAct, manual and IFC for pain relief.      Date of Discharge 2021        Salima Bullock, PT  Physical Therapist